# Patient Record
Sex: MALE | Race: WHITE | Employment: OTHER | URBAN - METROPOLITAN AREA
[De-identification: names, ages, dates, MRNs, and addresses within clinical notes are randomized per-mention and may not be internally consistent; named-entity substitution may affect disease eponyms.]

---

## 2017-03-01 ENCOUNTER — TELEPHONE (OUTPATIENT)
Dept: GASTROENTEROLOGY | Facility: CLINIC | Age: 82
End: 2017-03-01

## 2017-03-01 NOTE — TELEPHONE ENCOUNTER
GI RNs–   EGDcan wait until after radiation for prostate.  Please push back the recall until March 2017.  CB     Recall letter mailed home today

## 2017-03-07 ENCOUNTER — PRIOR ORIGINAL RECORDS (OUTPATIENT)
Dept: OTHER | Age: 82
End: 2017-03-07

## 2017-05-10 RX ORDER — MELOXICAM 7.5 MG/1
TABLET ORAL
Qty: 60 TABLET | Refills: 0 | Status: CANCELLED | OUTPATIENT
Start: 2017-05-10

## 2017-05-11 NOTE — TELEPHONE ENCOUNTER
Last refilled on 11/7/2016 #60 r-2  Left detailed message informing patient he will need to be evaluated in office in order to continue to get medications refilled (HIPAA verified)

## 2017-05-12 NOTE — TELEPHONE ENCOUNTER
Reviewed with Dr. Mercedes Garcia who states patient can get Meloxicam refilled from his primary care physician if no follow up is scheduled.  Refill denied

## 2017-05-15 RX ORDER — PRIMIDONE 50 MG/1
TABLET ORAL
Qty: 360 TABLET | Refills: 0 | Status: SHIPPED | OUTPATIENT
Start: 2017-05-15

## 2017-06-07 ENCOUNTER — PRIOR ORIGINAL RECORDS (OUTPATIENT)
Dept: OTHER | Age: 82
End: 2017-06-07

## 2020-08-31 ENCOUNTER — LAB ENCOUNTER (OUTPATIENT)
Dept: LAB | Age: 85
End: 2020-08-31
Payer: MEDICARE

## 2020-08-31 DIAGNOSIS — I48.91 ATRIAL FIBRILLATION (HCC): Primary | ICD-10-CM

## 2020-08-31 DIAGNOSIS — Z79.01 LONG TERM (CURRENT) USE OF ANTICOAGULANTS: ICD-10-CM

## 2020-08-31 DIAGNOSIS — I48.92 ATRIAL FLUTTER (HCC): ICD-10-CM

## 2020-08-31 LAB
INR BLD: 1.74 (ref 0.88–1.11)
PSA SERPL DL<=0.01 NG/ML-MCNC: 20.2 SECONDS (ref 12–14.3)

## 2020-08-31 PROCEDURE — 36415 COLL VENOUS BLD VENIPUNCTURE: CPT

## 2020-08-31 PROCEDURE — 85610 PROTHROMBIN TIME: CPT

## 2020-09-08 ENCOUNTER — LAB ENCOUNTER (OUTPATIENT)
Dept: LAB | Age: 85
End: 2020-09-08
Payer: MEDICARE

## 2020-09-08 DIAGNOSIS — I48.91 ATRIAL FIBRILLATION (HCC): Primary | ICD-10-CM

## 2020-09-08 DIAGNOSIS — Z79.01 LONG TERM (CURRENT) USE OF ANTICOAGULANTS: ICD-10-CM

## 2020-09-08 DIAGNOSIS — I48.92 ATRIAL FLUTTER (HCC): ICD-10-CM

## 2020-09-08 LAB
INR BLD: 2.5 (ref 0.88–1.11)
PSA SERPL DL<=0.01 NG/ML-MCNC: 26.8 SECONDS (ref 12–14.3)

## 2020-09-08 PROCEDURE — 85610 PROTHROMBIN TIME: CPT

## 2020-09-08 PROCEDURE — 36415 COLL VENOUS BLD VENIPUNCTURE: CPT

## 2023-10-27 NOTE — TELEPHONE ENCOUNTER
Refill request for meloxicam 7.5 mg, take 1-2 tabs daily, #60, no refills    LOV: 5/10/16  NOV: None What Type Of Note Output Would You Prefer (Optional)?: Standard Output How Severe Is Your Rash?: moderate Is This A New Presentation, Or A Follow-Up?: Rash

## (undated) NOTE — Clinical Note
3/1/2017    [place] [id number]            Dear Carla Rankin,      Our records indicate that you are due for an appointment for a EGD or Upper Endoscopy with Jens Beckofrd MD.    Please call our off